# Patient Record
Sex: FEMALE | Race: BLACK OR AFRICAN AMERICAN | NOT HISPANIC OR LATINO | URBAN - METROPOLITAN AREA
[De-identification: names, ages, dates, MRNs, and addresses within clinical notes are randomized per-mention and may not be internally consistent; named-entity substitution may affect disease eponyms.]

---

## 2017-02-24 ENCOUNTER — TRANSFERRED RECORDS (OUTPATIENT)
Dept: HEALTH INFORMATION MANAGEMENT | Facility: CLINIC | Age: 57
End: 2017-02-24

## 2017-02-24 ENCOUNTER — OFFICE VISIT (OUTPATIENT)
Dept: DERMATOLOGY | Facility: CLINIC | Age: 57
End: 2017-02-24

## 2017-02-24 VITALS — HEART RATE: 62 BPM | DIASTOLIC BLOOD PRESSURE: 86 MMHG | SYSTOLIC BLOOD PRESSURE: 151 MMHG

## 2017-02-24 DIAGNOSIS — L91.8 INFLAMED ACROCHORDON: Primary | ICD-10-CM

## 2017-02-24 DIAGNOSIS — L66.9 CICATRICIAL ALOPECIA: ICD-10-CM

## 2017-02-24 DIAGNOSIS — L30.9 DERMATITIS: ICD-10-CM

## 2017-02-24 RX ORDER — LAMOTRIGINE 150 MG/1
300 TABLET ORAL DAILY
COMMUNITY

## 2017-02-24 RX ORDER — FLUNISOLIDE 0.25 MG/ML
2 SOLUTION NASAL PRN
COMMUNITY

## 2017-02-24 RX ORDER — LACOSAMIDE 100 MG/1
100 TABLET ORAL 2 TIMES DAILY
COMMUNITY

## 2017-02-24 RX ORDER — OXCARBAZEPINE 300 MG/1
300 TABLET, FILM COATED ORAL DAILY
COMMUNITY
End: 2017-03-30

## 2017-02-24 RX ORDER — LAMOTRIGINE 100 MG/1
100 TABLET ORAL DAILY
COMMUNITY

## 2017-02-24 NOTE — PROGRESS NOTES
"[facility] Dermatology Note      Dermatology Problem List:  1. 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 On Dermasmoothe FS and Rogaine 5% foam. Improvement noted following ILK10.  2. Achrocordon    Encounter Date: Feb 24, 2017    CC:   Chief Complaint   Patient presents with     Derm Problem     Patient comes to clinic today for hairloss. States \"it's improved a little bit.\"       History of Present Illness:  Ms. Lynne Barnes is a 55 year old female who presents as a 3 month follow-up for cicatricial alopecia. At her list visit (March 2016) Ms. Barnes noted mild improvement following Kenalog 10 injection (Jan 2016, 1.6 ml triamcinolone 10 mg/ml @ 16 sites). Injections were repeated (1.0 ml triamcinolone 10 mg/ml) at 10 sites. She was instructed to continue 5% Rogaine foam daily, Derma Smooth FS oil weekly with shampoo the morning after, and olive oil based shampoo once weekly. She notes adherence with her therapy since March. Today she reports her hair is thickening on the sides, with some increased growth at the vertex and midline of her scalp. She denies any burning or pruritus of the scalp.    Past Medical History:   Patient Active Problem List   Diagnosis     Hyperpigmentation     Dermatitis     Acrochordon     Cicatricial alopecia     Localization-related (focal) (partial) symptomatic epilepsy and epileptic syndromes with complex partial seizures, intractable, with status epilepticus (H)     Atrophy of skin     Past Medical History   Diagnosis Date     Seizures (H)      History reviewed. No pertinent past surgical history.    Medications:  Current Outpatient Prescriptions   Medication Sig Dispense Refill     Lacosamide (VIMPAT) 100 MG TABS tablet Take 100 mg by mouth 2 times daily       lamoTRIgine (LAMICTAL) 150 MG tablet Take 300 mg by mouth daily       lamoTRIgine (LAMICTAL) 100 MG tablet Take 100 mg by mouth daily       OXcarbazepine " (TRILEPTAL) 300 MG tablet Take 300 mg by mouth daily       flunisolide (NASALIDE) 25 MCG/ACT (0.025%) SOLN spray Spray 2 sprays into both nostrils as needed       Fluocinolone Acetonide (DERMA-SMOOTHE/FS SCALP) 0.01 % OIL 1 to 2 capfulls to scalp every other day, no occlusion needed, shampoo in am 118 mL 4     Multiple Vitamins-Minerals (WOMENS DAILY FORMULA) TABS Take by mouth daily       lamoTRIgine (LAMICTAL) 50 MG ER tablet Take 7 tablets (350 mg) by mouth daily (Patient not taking: Reported on 2/24/2017) 210 tablet 2     OXcarbazepine (TRILEPTAL) 300 MG tablet Take 2 tablets (600 mg) by mouth 2 times daily (Patient not taking: Reported on 2/24/2017) 120 tablet 2        Allergies   Allergen Reactions     Oxycodone Cough     Reduces sodium       Review of Systems:  -Constitutional: The patient denies fatigue, fevers, chills, unintended weight loss, and night sweats.  -HEENT: Patient denies nonhealing oral sores.  -Skin: As above in HPI. No additional skin concerns.  -GI: denies abdominal pain, nausea, vomiting, diarrhea, constipation  -MSK: denies muscle aches, weakness, joint aches  -Pulm/CV: denies cough, chest pain, shortness of breath  -Heme: denies bleeding easily, tendency to bruise    Physical exam:  Vitals: /86 (BP Location: Left arm, Patient Position: Chair, Cuff Size: Adult Regular)  Pulse 62  GEN: This is a well developed, well-nourished female in no acute distress, in a pleasant mood.    SKIN: Focused examination of the scalp and face and hands was performed.  -scalp with multiple patches of hair, thickest along partial and occipital regions.   -Hair density decreased along mid-scalp, vertex, and crown.  -compared to previous images of the patient's scalp, mild regrowth noted along midline, vertex, and crown of scalp, though hair still sparse.  -no significant perifollicular erythema appreciated  -mild atrophy in multiple foci along midline of scalp and vertex  -please refer to photos of  scalp  -No other lesions of concern on areas examined.     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 Now s/p  ILK with some regrowth. Will repeat again today.    Kenalog intralesional injection procedure note: After verbal consent and discussion of risks including but not limited to atrophy, pain, and bruising, 1 total ml of Kenalog 10 mg/ml was injected into approximately 10 sites  on the superior scalp and vertex, avoiding areas of atrophy.  The patient tolerated the procedure well and left the Dermatology clinic in good condition. Approximately 10 sites were treated avoiding any atrophic areas.    Wear samantha loosely to prevent further traction.    Continue current therapy: Rogaine 5% foam daily, Derma Smooth FS oil once weekly with shampoo the morning after, olive oil-based shampoo weekly.    Atrophy, secondary to ILK injections. Discussed that this would improve over time. Such areas were avoided today with the ILK injections.     Follow-up in 3 months, earlier for new or changing lesions.     Discussed and examined with Dr. Hester, dermatology attending    Vamsi Michelle MD   PGY-2 Dermatology Resident  Pager [phone]    Staff Involved:  Resident(Vamsi Michelle)/Staff(as above)

## 2017-02-24 NOTE — PATIENT INSTRUCTIONS
1. Extra labs to check including at your visit: Vitamin D, Vitamin B12, Ferritin.   2. Follow-up in 3 months with Dr. Hester for hair loss.   3. Follow-up in 1 month with Dr. Michelle for skin tag near eye.

## 2017-02-24 NOTE — PROGRESS NOTES
"[facility] Dermatology Note      Dermatology Problem List:  1. 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 On Dermasmoothe FS and Rogaine 5% foam. Improvement noted following ILK10, last 6/28/16.  2. Achrocordon. L lateral eyelid  - s/p cyrotherapy 2/25/17    Encounter Date: Feb 24, 2017    CC:   Chief Complaint   Patient presents with     Derm Problem     Patient comes to clinic today for hairloss. States \"it's improved a little bit.\"       History of Present Illness:  Ms. Lynne Barnes is a 55 year old female who presents as follow-up for cicatricial alopecia. She was last seen on 6/28/16 when a total of 1 cc of ILK 10 mg/cc was injected into multiple sites on her scalp.     Today, she reports her hair loss is overall stable to slightly improved from prior. She denies any scalp pruritus, pain, or burning. No new areas of hair loss and feels as though hair density is roughly stable from prior. In terms of treatment, she is using a double dose of minoxidil 5% foam at nightime before bed and an olive oil-based shampoo. She occasionally use dermasmooth oil, about once a week. She usually wears a wig over he scalp so her hair loss is generally not bothersome to her.     She also reports a persistent skin tag on her right lateral eyelid. Occasionally itchy and gets caught on her glasses and sometimes impairs vision. She states this has been treated in the past with cryotherapy with some improvement, though it has since returned. She has noticed some hyperpigmentation in the areas surrounding treatment and is concerned about further cryotherapy to these areas.     Otherwise, she reports a recent breakthrough seizure in setting of hyponatremia (felt to be medication induced from her oxcarbazepine), which is now improving after dose changes to her antiepileptics. She is now taking lamictal 100 mg PO qam, lamictal 150 mg PO afternoon and evening, oxcarbazepine 300 " mg PO BID with plan to further titrate down her oxcarbazepine (presumed causes of her hyponatremia). She otherwise has no other skin concerns.      Past Medical History:   Patient Active Problem List   Diagnosis     Hyperpigmentation     Dermatitis     Acrochordon     Cicatricial alopecia     Localization-related (focal) (partial) symptomatic epilepsy and epileptic syndromes with complex partial seizures, intractable, with status epilepticus (H)     Atrophy of skin     Past Medical History   Diagnosis Date     Seizures (H)      History reviewed. No pertinent past surgical history.    Medications:  Current Outpatient Prescriptions   Medication Sig Dispense Refill     Lacosamide (VIMPAT) 100 MG TABS tablet Take 100 mg by mouth 2 times daily       lamoTRIgine (LAMICTAL) 150 MG tablet Take 300 mg by mouth daily       lamoTRIgine (LAMICTAL) 100 MG tablet Take 100 mg by mouth daily       OXcarbazepine (TRILEPTAL) 300 MG tablet Take 300 mg by mouth daily       flunisolide (NASALIDE) 25 MCG/ACT (0.025%) SOLN spray Spray 2 sprays into both nostrils as needed       Fluocinolone Acetonide (DERMA-SMOOTHE/FS SCALP) 0.01 % OIL 1 to 2 capfulls to scalp every other day, no occlusion needed, shampoo in am 118 mL 4     Multiple Vitamins-Minerals (WOMENS DAILY FORMULA) TABS Take by mouth daily       lamoTRIgine (LAMICTAL) 50 MG ER tablet Take 7 tablets (350 mg) by mouth daily (Patient not taking: Reported on 2/24/2017) 210 tablet 2     OXcarbazepine (TRILEPTAL) 300 MG tablet Take 2 tablets (600 mg) by mouth 2 times daily (Patient not taking: Reported on 2/24/2017) 120 tablet 2        Allergies   Allergen Reactions     Oxycodone Cough     Reduces sodium       Review of Systems:  -Constitutional: The patient denies fatigue, fevers, chills, unintended weight loss, and night sweats.  -HEENT: Patient denies nonhealing oral sores.  -Skin: As above in HPI. No additional skin concerns.  -GI: denies abdominal pain, nausea, vomiting, diarrhea,  constipation  -MSK: denies muscle aches, weakness, joint aches  -Pulm/CV: denies cough, chest pain, shortness of breath  -Heme: denies bleeding easily, tendency to bruise    Physical exam:  Vitals: /86 (BP Location: Left arm, Patient Position: Chair, Cuff Size: Adult Regular)  Pulse 62  GEN: This is a well developed, well-nourished female in no acute distress, in a pleasant mood.    SKIN: Focused examination of the scalp and face and hands was performed.  - There is patchy alopecia of the mid-scalp, vertex, and crown with intermixed terminal hairs. No scalp erythema, perifollicular accentuation, erythema or scale. Negative hair pull test. .  - Mild atrophy in multiple foci along midline of scalp and vertex  - On the right lateral eyelid, there is a 2 mm flesh-colored to hyperpigmented papules with surrounding base of patchy hyperpigmentation. Non-tender to palpation.   - No other lesions of concern on areas examined.     Impression/Plan:    1. 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 Overall disease appears stable on exam today with no active inflammation and we deferred further ILK injections. Would continue current treatment regimen with plan for follow-up in 3 months.     Wear samantha loosely to prevent further traction.    Continue current therapy: Rogaine 5% foam daily, Derma Smooth FS oil once weekly with shampoo the morning after, olive oil-based shampoo weekly.    Clinical photos taken today for future reference    Follow-up in 3 months    2. Inflamed acrochordon. Discussed various treatment options including cryotherapy versus shave excision. She agreed to repeat cryotherapy locally applied with forceps and to consider shave excision in the future if these were not to be effective     Total of 1 lesion as above treated with 10-15 seconds of cryotherapy for 2-cycles. Patient tolerated the procedure well. Risk and benefits including the risk of  scarring and hypo- or hyperpigmentation discussed prior to procedure. Patient expressed understanding.     Follow-up in 1 month with Dr. Michelle in continuity clinic  for possible repeat cryotherapy versus shave excision.     Follow-up in 1 month with Dr. Michelle (acochordon) and in 3 months with Dr. Hester (cicatricial alopecia), earlier for new or changing lesions.    Dr. Hester staffed the patient.     Vamsi Michelle MD   PGY-2 Dermatology Resident  Pager [phone]    Staff Involved:  Resident(Vamsi Michelle)/Staff(as above)    Patient was seen and examined with the dermatology resident. I agree with the history, review of systems, physical examination, assessments and plan. The cryotherapy procedure was performed together.     Arianna Hester MD  Professor and  Chair  Department of Dermatology  [facility]

## 2017-02-24 NOTE — LETTER
"2/24/2017       RE: Lynne Barnes  [address] [place] [address]     Dear Colleague,    Thank you for referring your patient, Lynne Barnes, to the [facility] DERMATOLOGY at [facility]. Please see a copy of my visit note below.                    Pictures taken of patient today to be placed in chart for future reference.      [facility] Dermatology Note      Dermatology Problem List:  1. 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 On Dermasmoothe FS and Rogaine 5% foam. Improvement noted following ILK10, last 6/28/16.  2. Achrocordon. L lateral eyelid  - s/p cyrotherapy 2/25/17    Encounter Date: Feb 24, 2017    CC:   Chief Complaint   Patient presents with     Derm Problem     Patient comes to clinic today for hairloss. States \"it's improved a little bit.\"       History of Present Illness:  Ms. Lynne Barnes is a 55 year old female who presents as follow-up for cicatricial alopecia. She was last seen on 6/28/16 when a total of 1 cc of ILK 10 mg/cc was injected into multiple sites on her scalp.     Today, she reports her hair loss is overall stable to slightly improved from prior. She denies any scalp pruritus, pain, or burning. No new areas of hair loss and feels as though hair density is roughly stable from prior. In terms of treatment, she is using a double dose of minoxidil 5% foam at nightime before bed and an olive oil-based shampoo. She occasionally use dermasmooth oil, about once a week. She usually wears a wig over he scalp so her hair loss is generally not bothersome to her.     She also reports a persistent skin tag on her right lateral eyelid. Occasionally itchy and gets caught on her glasses and sometimes impairs vision. She states this has been treated in the past with cryotherapy with some improvement, though it has since returned. She has noticed some hyperpigmentation in " the areas surrounding treatment and is concerned about further cryotherapy to these areas.     Otherwise, she reports a recent breakthrough seizure in setting of hyponatremia (felt to be medication induced from her oxcarbazepine), which is now improving after dose changes to her antiepileptics. She is now taking lamictal 100 mg PO qam, lamictal 150 mg PO afternoon and evening, oxcarbazepine 300 mg PO BID with plan to further titrate down her oxcarbazepine (presumed causes of her hyponatremia). She otherwise has no other skin concerns.      Past Medical History:   Patient Active Problem List   Diagnosis     Hyperpigmentation     Dermatitis     Acrochordon     Cicatricial alopecia     Localization-related (focal) (partial) symptomatic epilepsy and epileptic syndromes with complex partial seizures, intractable, with status epilepticus (H)     Atrophy of skin     Past Medical History   Diagnosis Date     Seizures (H)      History reviewed. No pertinent past surgical history.    Medications:  Current Outpatient Prescriptions   Medication Sig Dispense Refill     Lacosamide (VIMPAT) 100 MG TABS tablet Take 100 mg by mouth 2 times daily       lamoTRIgine (LAMICTAL) 150 MG tablet Take 300 mg by mouth daily       lamoTRIgine (LAMICTAL) 100 MG tablet Take 100 mg by mouth daily       OXcarbazepine (TRILEPTAL) 300 MG tablet Take 300 mg by mouth daily       flunisolide (NASALIDE) 25 MCG/ACT (0.025%) SOLN spray Spray 2 sprays into both nostrils as needed       Fluocinolone Acetonide (DERMA-SMOOTHE/FS SCALP) 0.01 % OIL 1 to 2 capfulls to scalp every other day, no occlusion needed, shampoo in am 118 mL 4     Multiple Vitamins-Minerals (WOMENS DAILY FORMULA) TABS Take by mouth daily       lamoTRIgine (LAMICTAL) 50 MG ER tablet Take 7 tablets (350 mg) by mouth daily (Patient not taking: Reported on 2/24/2017) 210 tablet 2     OXcarbazepine (TRILEPTAL) 300 MG tablet Take 2 tablets (600 mg) by mouth 2 times daily (Patient not taking:  Reported on 2/24/2017) 120 tablet 2        Allergies   Allergen Reactions     Oxycodone Cough     Reduces sodium       Review of Systems:  -Constitutional: The patient denies fatigue, fevers, chills, unintended weight loss, and night sweats.  -HEENT: Patient denies nonhealing oral sores.  -Skin: As above in HPI. No additional skin concerns.  -GI: denies abdominal pain, nausea, vomiting, diarrhea, constipation  -MSK: denies muscle aches, weakness, joint aches  -Pulm/CV: denies cough, chest pain, shortness of breath  -Heme: denies bleeding easily, tendency to bruise    Physical exam:  Vitals: /86 (BP Location: Left arm, Patient Position: Chair, Cuff Size: Adult Regular)  Pulse 62  GEN: This is a well developed, well-nourished female in no acute distress, in a pleasant mood.    SKIN: Focused examination of the scalp and face and hands was performed.  - There is patchy alopecia of the mid-scalp, vertex, and crown with intermixed terminal hairs. No scalp erythema, perifollicular accentuation, erythema or scale. Negative hair pull test. .  - Mild atrophy in multiple foci along midline of scalp and vertex  - On the right lateral eyelid, there is a 2 mm flesh-colored to hyperpigmented papules with surrounding base of patchy hyperpigmentation. Non-tender to palpation.   - No other lesions of concern on areas examined.     Impression/Plan:    1. 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 Overall disease appears stable on exam today with no active inflammation and we deferred further ILK injections. Would continue current treatment regimen with plan for follow-up in 3 months.     Wear samantha loosely to prevent further traction.    Continue current therapy: Rogaine 5% foam daily, Derma Smooth FS oil once weekly with shampoo the morning after, olive oil-based shampoo weekly.    Clinical photos taken today for future reference    Follow-up in 3 months    2. Inflamed  elvia. Discussed various treatment options including cryotherapy versus shave excision. She agreed to repeat cryotherapy locally applied with forceps and to consider shave excision in the future if these were not to be effective     Total of 1 lesion as above treated with 10-15 seconds of cryotherapy for 2-cycles. Patient tolerated the procedure well. Risk and benefits including the risk of scarring and hypo- or hyperpigmentation discussed prior to procedure. Patient expressed understanding.     Follow-up in 1 month with Dr. Michelle in continuity clinic  for possible repeat cryotherapy versus shave excision.     Follow-up in 1 month with Dr. Michelle (acochordon) and in 3 months with Dr. Hester (cicatricial alopecia), earlier for new or changing lesions.    Dr. Hester staffed the patient.     Vamsi Michelle MD   PGY-2 Dermatology Resident  Pager [phone]    Staff Involved:  Resident(Vamsi Michelle)/Staff(as above)    Patient was seen and examined with the dermatology resident. I agree with the history, review of systems, physical examination, assessments and plan. The cryotherapy procedure was performed together.     Arianna Hester MD  Professor and  Chair  Department of Dermatology  [facility]

## 2017-02-24 NOTE — NURSING NOTE
"Dermatology Rooming Note    Lynne Barnes's goals for this visit include:   Chief Complaint   Patient presents with     Derm Problem     Patient comes to clinic today for hairloss. States \"it's improved a little bit.\"     Leonor Andujar, [facility]    "

## 2017-02-24 NOTE — MR AVS SNAPSHOT
After Visit Summary   2/24/2017    Lynne Barnes    MRN: [id number]           Patient Information     Date Of Birth          12/20/1961        Visit Information        Provider Department      2/24/2017 9:30 AM Arianna Hester MD [facility]        Today's Diagnoses     Inflamed acrochordon    -  1      Care Instructions    1. Extra labs to check including at your visit: Vitamin D, Vitamin B12, Ferritin.   2. Follow-up in 3 months with Dr. Hester for hair loss.   3. Follow-up in 1 month with Dr. Michelle for skin tag near eye.         Follow-ups after your visit        Your next 10 appointments already scheduled     Mar 30, 2017 10:30 AM CDT   (Arrive by 10:15 AM)   RETURN HAIRLOSS with Vamsi Michelle MD   [facility] ([facility])    [address] [place] [address] [phone]              Who to contact     Please call your clinic at [phone] to:    Ask questions about your health    Make or cancel appointments    Discuss your medicines    Learn about your test results    Speak to your doctor   If you have compliments or concerns about an experience at your clinic, or if you wish to file a complaint, please contact [facility] Patient Relations at [phone] or email us at [email or web address]         Additional Information About Your Visit        MyChart Information     [employer] is an electronic gateway that provides easy, online access to your medical records. With OR[employer]O, you can request a clinic appointment, read your test results, renew a prescription or communicate with your care team.     To sign up for [employer] visit the website at [email or web address]   You will be asked to enter the access code listed below, as well as some personal information. Please follow the directions to create your username and password.     Your access code is:  [email or web address]  Expires: 2017  6:30 AM     Your access code will  in 90 days. If you need help or a new code, please contact your [facility] or call [phone] for assistance.        Care EveryWhere ID     This is your Care EveryWhere ID. This could be used by other organizations to access your [place] medical records  [id number]        Your Vitals Were     Pulse                   62            Blood Pressure from Last 3 Encounters:   17 151/86   16 166/90   16 150/67    Weight from Last 3 Encounters:   16 54.4 kg (120 lb)   16 58.7 kg (129 lb 6.4 oz)   11/13/15 54.3 kg (119 lb 9.6 oz)              We Performed the Following     DESTRUCT BENIGN LESION, UP TO 14        Primary Care Provider Office Phone # Fax #    Luis Honeycutt [phone]       [place] [address] [address] [address]        Thank you!     Thank you for choosing [facility]ER[facility]  for your care. Our goal is always to provide you with excellent care. Hearing back from our patients is one way we can continue to improve our services. Please take a few minutes to complete the written survey that you may receive in the mail after your visit with us. Thank you!             Your Updated Medication List - Protect others around you: Learn how to safely use, store and throw away your medicines at www.disposemymeds.org.          This list is accurate as of: 17 10:44 AM.  Always use your most recent med list.                   Brand Name Dispense Instructions for use    DERMA-SMOOTHE/FS SCALP 0.01 % Oil     118 mL    1 to 2 capfulls to scalp every other day, no occlusion needed, shampoo in am       flunisolide 25 MCG/ACT (0.025%) Soln spray    NASALIDE     Spray 2 sprays into both nostrils as needed       * LAMICTAL 150 MG tablet   Generic drug:  lamoTRIgine      Take 300 mg by mouth daily       * LAMICTAL 100 MG tablet   Generic drug:   lamoTRIgine      Take 100 mg by mouth daily       * lamoTRIgine 50 MG 24 hr tablet    LaMICtal    210 tablet    Take 7 tablets (350 mg) by mouth daily       * TRILEPTAL 300 MG tablet   Generic drug:  OXcarbazepine      Take 300 mg by mouth daily       * OXcarbazepine 300 MG tablet    TRILEPTAL    120 tablet    Take 2 tablets (600 mg) by mouth 2 times daily       VIMPAT 100 MG Tabs tablet   Generic drug:  Lacosamide      Take 100 mg by mouth 2 times daily       WOMENS DAILY FORMULA Tabs      Take by mouth daily       * Notice:  This list has 5 medication(s) that are the same as other medications prescribed for you. Read the directions carefully, and ask your doctor or other care provider to review them with you.

## 2017-03-02 ENCOUNTER — TELEPHONE (OUTPATIENT)
Dept: DERMATOLOGY | Facility: CLINIC | Age: 57
End: 2017-03-02

## 2017-03-30 ENCOUNTER — OFFICE VISIT (OUTPATIENT)
Dept: DERMATOLOGY | Facility: CLINIC | Age: 57
End: 2017-03-30

## 2017-03-30 DIAGNOSIS — H01.9 DERMATITIS OF EYELIDS OF BOTH EYES: ICD-10-CM

## 2017-03-30 DIAGNOSIS — L66.9 CICATRICIAL ALOPECIA: ICD-10-CM

## 2017-03-30 DIAGNOSIS — L91.8 INFLAMED ACROCHORDON: Primary | ICD-10-CM

## 2017-03-30 ASSESSMENT — PAIN SCALES - GENERAL: PAINLEVEL: NO PAIN (0)

## 2017-03-30 NOTE — PATIENT INSTRUCTIONS
For eyelid itch,   1. Start using over-the-counter hydrocortisone 1% ointment once or twice daily as needed.

## 2017-03-30 NOTE — NURSING NOTE
Chief Complaint   Patient presents with     Derm Problem     Has a small lesion next to her right eye she would like checked out     Ajay Ayala LPN

## 2017-03-30 NOTE — PROGRESS NOTES
[facility] Dermatology Note      Dermatology Problem List:  1. 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 On Dermasmoothe FS and Rogaine 5% foam. Improvement noted following ILK10, last 6/28/16.  2. Achrocordon. R lateral eyelid  - s/p cyrotherapy 2/25/17  3. Eyelid dermatitis, bilaterally.   - hydrocortisone 1% ointment BID PRN    Encounter Date: Mar 30, 2017    CC:   Chief Complaint   Patient presents with     Derm Problem     Has a small lesion next to her right eye she would like checked out       History of Present Illness:  Ms. Lynne Barnes is a 56 year old female who presents as follow-up for achrocodron on her R lateral eyelid. She was last seen on 2/24/17 for follow-up of cicatricial alopecia with Dr. Hester. She incidentally also had an acrochordon on her R lateral eyelid that was treated with cryotherapy with plan for follow-up in 1 month if this do not resolve to consider repeat cryotherapy or shave excision.     Today, she reports that her lesion is improved but still present. Has noticed some mild hyperpigmentation around site where it was treated last time. Of note, does report a 2-3 week history of intermittent pruritus around the bilateral eyelids (L>R). She has been applying vaseline to that area without relief. Denies any new topical exposures or known allergies. Does wear nail polish. She has no other skin concerns.       Past Medical History:   Patient Active Problem List   Diagnosis     Hyperpigmentation     Dermatitis     Acrochordon     Cicatricial alopecia     Localization-related (focal) (partial) symptomatic epilepsy and epileptic syndromes with complex partial seizures, intractable, with status epilepticus (H)     Atrophy of skin     Past Medical History:   Diagnosis Date     Seizures (H)      History reviewed. No pertinent surgical history.    Medications:  Current Outpatient Prescriptions   Medication Sig Dispense Refill      MULTIPLE VITAMINS-MINERALS PO        Lacosamide (VIMPAT) 100 MG TABS tablet Take 100 mg by mouth 2 times daily       lamoTRIgine (LAMICTAL) 150 MG tablet Take 300 mg by mouth daily       lamoTRIgine (LAMICTAL) 100 MG tablet Take 100 mg by mouth daily       flunisolide (NASALIDE) 25 MCG/ACT (0.025%) SOLN spray Spray 2 sprays into both nostrils as needed       OXcarbazepine (TRILEPTAL) 300 MG tablet Take 2 tablets (600 mg) by mouth 2 times daily 120 tablet 2     Fluocinolone Acetonide (DERMA-SMOOTHE/FS SCALP) 0.01 % OIL 1 to 2 capfulls to scalp every other day, no occlusion needed, shampoo in am 118 mL 4     [DISCONTINUED] OXcarbazepine (TRILEPTAL) 300 MG tablet Take 300 mg by mouth daily       [DISCONTINUED] lamoTRIgine (LAMICTAL) 50 MG ER tablet Take 7 tablets (350 mg) by mouth daily (Patient not taking: Reported on 2/24/2017) 210 tablet 2        Allergies   Allergen Reactions     Oxycodone Cough     Reduces sodium       Review of Systems:  -Constitutional: The patient denies fatigue, fevers, chills, unintended weight loss, and night sweats.  -HEENT: Patient denies nonhealing oral sores.  -Skin: As above in HPI. No additional skin concerns.  -GI: denies abdominal pain, nausea, vomiting, diarrhea, constipation  -MSK: denies muscle aches, weakness, joint aches  -Pulm/CV: denies cough, chest pain, shortness of breath  -Heme: denies bleeding easily, tendency to bruise    Physical exam:  Vitals: There were no vitals taken for this visit.  GEN: This is a well developed, well-nourished female in no acute distress, in a pleasant mood.    SKIN: Focused examination of the scalp and face and hands was performed.  - There is patchy alopecia of the mid-scalp, vertex, and crown with intermixed terminal hairs. No scalp erythema, perifollicular accentuation, erythema or scale. Negative hair pull test. .  - Mild atrophy in multiple foci along midline of scalp and vertex  - On the right lateral eyelid, there is a 2 mm  flesh-colored to hyperpigmented papule with surrounding base of patchy hyperpigmentation. Non-tender to palpation.   - Mild hyperpigmentation bilaterally on upper and lower eyelids.   - No other lesions of concern on areas examined.     Impression/Plan:    1. Inflamed acrochordon. Recommended treatment with electrocautery today for removal and to limit possibility of post-inflammatory hyperpigmented    Total of 1 lesion as above treated with electrocautery at setting of 3. Patient tolerated the procedure well and lesion was completely removed.     Follow-up as needed.     2. Eyelid dermatitis. Recommended starting OTC hydrocortisone 1% ointment as needed until resolved.     3. 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      Follow-up in 2 months with Dr. Hester as previously planned.     Follow-up as needed with Dr. Michelle, 2 months with Dr. Hester for CCA.   Dr. Bales staffed the patient.     Vamsi Michelle MD   PGY-2 Dermatology Resident  Pager [phone]    Staff Involved:  Resident(Vamsi Michelle)/Staff(as above)

## 2017-03-30 NOTE — PROGRESS NOTES
I talked with and examined Lynne Barnes and I agree with the assessment and the plan. I was present for the entire procedure. RAJNI Bales MD.

## 2017-03-30 NOTE — LETTER
3/30/2017       RE: Lynne Barnes  [address] [place] [address]     Dear Colleague,    Thank you for referring your patient, Lynne Barnes, to the [facility] at [facility]. Please see a copy of my visit note below.    I talked with and examined Lynne Barnes and I agree with the assessment and the plan. I was present for the entire procedure. RAJNI Bales MD.      [facility] Dermatology Note      Dermatology Problem List:  1. 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 On Dermasmoothe FS and Rogaine 5% foam. Improvement noted following ILK10, last 6/28/16.  2. Achrocordon. R lateral eyelid  - s/p cyrotherapy 2/25/17  3. Eyelid dermatitis, bilaterally.   - hydrocortisone 1% ointment BID PRN    Encounter Date: Mar 30, 2017    CC:   Chief Complaint   Patient presents with     Derm Problem     Has a small lesion next to her right eye she would like checked out       History of Present Illness:  Ms. Lynne Barnes is a 56 year old female who presents as follow-up for achrocodron on her R lateral eyelid. She was last seen on 2/24/17 for follow-up of cicatricial alopecia with Dr. Hester. She incidentally also had an acrochordon on her R lateral eyelid that was treated with cryotherapy with plan for follow-up in 1 month if this do not resolve to consider repeat cryotherapy or shave excision.     Today, she reports that her lesion is improved but still present. Has noticed some mild hyperpigmentation around site where it was treated last time. Of note, does report a 2-3 week history of intermittent pruritus around the bilateral eyelids (L>R). She has been applying vaseline to that area without relief. Denies any new topical exposures or known allergies. Does wear nail polish. She has no other skin concerns.       Past Medical History:   Patient Active Problem List    Diagnosis     Hyperpigmentation     Dermatitis     Acrochordon     Cicatricial alopecia     Localization-related (focal) (partial) symptomatic epilepsy and epileptic syndromes with complex partial seizures, intractable, with status epilepticus (H)     Atrophy of skin     Past Medical History:   Diagnosis Date     Seizures (H)      History reviewed. No pertinent surgical history.    Medications:  Current Outpatient Prescriptions   Medication Sig Dispense Refill     MULTIPLE VITAMINS-MINERALS PO        Lacosamide (VIMPAT) 100 MG TABS tablet Take 100 mg by mouth 2 times daily       lamoTRIgine (LAMICTAL) 150 MG tablet Take 300 mg by mouth daily       lamoTRIgine (LAMICTAL) 100 MG tablet Take 100 mg by mouth daily       flunisolide (NASALIDE) 25 MCG/ACT (0.025%) SOLN spray Spray 2 sprays into both nostrils as needed       OXcarbazepine (TRILEPTAL) 300 MG tablet Take 2 tablets (600 mg) by mouth 2 times daily 120 tablet 2     Fluocinolone Acetonide (DERMA-SMOOTHE/FS SCALP) 0.01 % OIL 1 to 2 capfulls to scalp every other day, no occlusion needed, shampoo in am 118 mL 4     [DISCONTINUED] OXcarbazepine (TRILEPTAL) 300 MG tablet Take 300 mg by mouth daily       [DISCONTINUED] lamoTRIgine (LAMICTAL) 50 MG ER tablet Take 7 tablets (350 mg) by mouth daily (Patient not taking: Reported on 2/24/2017) 210 tablet 2        Allergies   Allergen Reactions     Oxycodone Cough     Reduces sodium       Review of Systems:  -Constitutional: The patient denies fatigue, fevers, chills, unintended weight loss, and night sweats.  -HEENT: Patient denies nonhealing oral sores.  -Skin: As above in HPI. No additional skin concerns.  -GI: denies abdominal pain, nausea, vomiting, diarrhea, constipation  -MSK: denies muscle aches, weakness, joint aches  -Pulm/CV: denies cough, chest pain, shortness of breath  -Heme: denies bleeding easily, tendency to bruise    Physical exam:  Vitals: There were no vitals taken for this visit.  GEN: This is a well  developed, well-nourished female in no acute distress, in a pleasant mood.    SKIN: Focused examination of the scalp and face and hands was performed.  - There is patchy alopecia of the mid-scalp, vertex, and crown with intermixed terminal hairs. No scalp erythema, perifollicular accentuation, erythema or scale. Negative hair pull test. .  - Mild atrophy in multiple foci along midline of scalp and vertex  - On the right lateral eyelid, there is a 2 mm flesh-colored to hyperpigmented papule with surrounding base of patchy hyperpigmentation. Non-tender to palpation.   - Mild hyperpigmentation bilaterally on upper and lower eyelids.   - No other lesions of concern on areas examined.     Impression/Plan:    1. Inflamed acrochordon. Recommended treatment with electrocautery today for removal and to limit possibility of post-inflammatory hyperpigmented    Total of 1 lesion as above treated with electrocautery at setting of 3. Patient tolerated the procedure well and lesion was completely removed.     Follow-up as needed.     2. Eyelid dermatitis. Recommended starting OTC hydrocortisone 1% ointment as needed until resolved.     3. 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      Follow-up in 2 months with Dr. Hester as previously planned.     Follow-up as needed with Dr. Michelle, 2 months with Dr. Hester for CCA.   Dr. Bales staffed the patient.     Vamsi Michelle MD   PGY-2 Dermatology Resident  Pager [phone]    Staff Involved:  Resident(Vamsi Michelle)/Staff(as above)    Again, thank you for allowing me to participate in the care of your patient.      Sincerely,    Vamsi Michelle MD

## 2017-03-30 NOTE — MR AVS SNAPSHOT
After Visit Summary   3/30/2017    Lynne Barnes    MRN: [id number]           Patient Information     Date Of Birth          1960        Visit Information        Provider Department      3/30/2017 10:30 AM Vamsi Michelle MD [facility]        Care Instructions    For eyelid itch,   1. Start using over-the-counter hydrocortisone 1% ointment once or twice daily as needed.         Follow-ups after your visit        Who to contact     Please call your clinic at [phone] to:    Ask questions about your health    Make or cancel appointments    Discuss your medicines    Learn about your test results    Speak to your doctor   If you have compliments or concerns about an experience at your clinic, or if you wish to file a complaint, please contact [facility] Patient Relations at [phone] or email us at [email or web address]         Additional Information About Your Visit        MyChart Information     [employer] is an electronic gateway that provides easy, online access to your medical records. With [address], you can request a clinic appointment, read your test results, renew a prescription or communicate with your care team.     To sign up for [employer] visit the website at [email or web address]   You will be asked to enter the access code listed below, as well as some personal information. Please follow the directions to create your username and password.     Your access code is: [email or web address]  Expires: 2017  7:30 AM     Your access code will  in 90 days. If you need help or a new code, please contact your [facility] or call [phone] for assistance.        Care EveryWhere ID     This is your Care EveryWhere ID. This could be used by other organizations to access your [place] medical records  [id number]         Blood Pressure from Last 3 Encounters:   17 151/86   16 166/90    04/30/16 150/67    Weight from Last 3 Encounters:   04/27/16 54.4 kg (120 lb)   01/22/16 58.7 kg (129 lb 6.4 oz)   11/13/15 54.3 kg (119 lb 9.6 oz)              Today, you had the following     No orders found for display         Today's Medication Changes          These changes are accurate as of: 3/30/17 11:04 AM.  If you have any questions, ask your nurse or doctor.               These medicines have changed or have updated prescriptions.        Dose/Directions    * LAMICTAL 150 MG tablet   This may have changed:  Another medication with the same name was removed. Continue taking this medication, and follow the directions you see here.   Generic drug:  lamoTRIgine   Changed by:  Arianna Hester MD        Dose:  300 mg   Take 300 mg by mouth daily   Refills:  0       * LAMICTAL 100 MG tablet   This may have changed:  Another medication with the same name was removed. Continue taking this medication, and follow the directions you see here.   Generic drug:  lamoTRIgine   Changed by:  Arianna Hester MD        Dose:  100 mg   Take 100 mg by mouth daily   Refills:  0       MULTIPLE VITAMINS-MINERALS PO   This may have changed:  Another medication with the same name was removed. Continue taking this medication, and follow the directions you see here.   Changed by:  Vamsi Michelle MD        Refills:  0       OXcarbazepine 300 MG tablet   Commonly known as:  TRILEPTAL   This may have changed:  Another medication with the same name was removed. Continue taking this medication, and follow the directions you see here.   Used for:  Epilepsy without status epilepticus, not intractable, unspecified        Dose:  600 mg   Take 2 tablets (600 mg) by mouth 2 times daily   Quantity:  120 tablet   Refills:  2       * Notice:  This list has 2 medication(s) that are the same as other medications prescribed for you. Read the directions carefully, and ask your doctor or other care provider to review  them with you.             Primary Care Provider Office Phone # Fax #    Luis Honeycutt [phone]       [place] [address] [address] [facility] [address]  [facility] [address]        Thank you!     Thank you for choosing [facility]ER[facility]  for your care. Our goal is always to provide you with excellent care. Hearing back from our patients is one way we can continue to improve our services. Please take a few minutes to complete the written survey that you may receive in the mail after your visit with us. Thank you!             Your Updated Medication List - Protect others around you: Learn how to safely use, store and throw away your medicines at www.disposemymeds.org.          This list is accurate as of: 3/30/17 11:04 AM.  Always use your most recent med list.                   Brand Name Dispense Instructions for use    DERMA-SMOOTHE/FS SCALP 0.01 % Oil     118 mL    1 to 2 capfulls to scalp every other day, no occlusion needed, shampoo in am       flunisolide 25 MCG/ACT (0.025%) Soln spray    NASALIDE     Spray 2 sprays into both nostrils as needed       * LAMICTAL 150 MG tablet   Generic drug:  lamoTRIgine      Take 300 mg by mouth daily       * LAMICTAL 100 MG tablet   Generic drug:  lamoTRIgine      Take 100 mg by mouth daily       MULTIPLE VITAMINS-MINERALS PO          OXcarbazepine 300 MG tablet    TRILEPTAL    120 tablet    Take 2 tablets (600 mg) by mouth 2 times daily       VIMPAT 100 MG Tabs tablet   Generic drug:  Lacosamide      Take 100 mg by mouth 2 times daily       * Notice:  This list has 2 medication(s) that are the same as other medications prescribed for you. Read the directions carefully, and ask your doctor or other care provider to review them with you.

## 2017-05-24 ENCOUNTER — TELEPHONE (OUTPATIENT)
Dept: DERMATOLOGY | Facility: CLINIC | Age: 57
End: 2017-05-24

## 2017-09-01 ENCOUNTER — OFFICE VISIT (OUTPATIENT)
Dept: DERMATOLOGY | Facility: CLINIC | Age: 57
End: 2017-09-01

## 2017-09-01 VITALS — SYSTOLIC BLOOD PRESSURE: 164 MMHG | HEART RATE: 57 BPM | DIASTOLIC BLOOD PRESSURE: 85 MMHG

## 2017-09-01 DIAGNOSIS — L66.9 SCARRING ALOPECIA: ICD-10-CM

## 2017-09-01 DIAGNOSIS — L30.9 DERMATITIS: Primary | ICD-10-CM

## 2017-09-01 RX ORDER — PIMECROLIMUS 10 MG/G
CREAM TOPICAL DAILY
Qty: 60 G | Refills: 1 | Status: SHIPPED | OUTPATIENT
Start: 2017-09-01 | End: 2018-09-07

## 2017-09-01 RX ORDER — FLUOCINOLONE ACETONIDE 0.11 MG/ML
OIL TOPICAL
Qty: 118 ML | Refills: 4 | Status: SHIPPED | OUTPATIENT
Start: 2017-09-01 | End: 2017-12-08

## 2017-09-01 ASSESSMENT — PAIN SCALES - GENERAL: PAINLEVEL: NO PAIN (0)

## 2017-09-01 NOTE — LETTER
"9/1/2017       RE: Lynne Barnes  [address] [place] [address]     Dear Colleague,    Thank you for referring your patient, Lynne Barnes, to the [facility] DERMATOLOGY at [facility]. Please see a copy of my visit note below.    [facility] Dermatology Note      Dermatology Problem List:  1.Cicatricial alopecia - biopsy 2/2014 with Ddx of LPP, FFA, traction alopecia   2.Acrochordon R lateral eyelid  -s/p cryotherapy 2/25/17 and electrocautery 3/30/17    Encounter Date: Sep 1, 2017    CC:  Chief Complaint   Patient presents with     Hair Loss     Lynne is here today for a recheck for hairloss. She states that the front is still having issues growning.          History of Present Illness:  Ms. Lynne Barnes is a 56 year old female who is here to follow up on cicatricial alopecia, inflamed acrochordon, and eyelid dermatitis, last seen 3/30. She is using rogaine 5% foam nightly, and dermasmoothe oil every other week. For shampoo she uses \"All Purpose\" shampoo/conditioner in one nightly. She feels her hair loss is overall slightly improved in the back with no change in the front. She feels that at times she sees some growth on the front but then it goes away. She denies scalp pain, itching, burning, or tingling.    She continues to have periorbital itching and is also concerned about the hyperpigmentation around her eyes. She never started using the hydrocortisone cream for eyelid dermatitis. She has been applying vaseline to the area with no relief. The acrochordon treated on the right lateral eyelid is now gone. She also reports two seizures in the last month, she has since seen her neurologist who put her back on lamictal. Otherwise has been healthy.     Past Medical History:   Patient Active Problem List   Diagnosis     Hyperpigmentation     Dermatitis     Acrochordon     Cicatricial alopecia     Localization-related " (focal) (partial) symptomatic epilepsy and epileptic syndromes with complex partial seizures, intractable, with status epilepticus (H)     Atrophy of skin     Past Medical History:   Diagnosis Date     Seizures (H)      History reviewed. No pertinent surgical history.      Medications:  Current Outpatient Prescriptions   Medication Sig Dispense Refill     MULTIPLE VITAMINS-MINERALS PO        Lacosamide (VIMPAT) 100 MG TABS tablet Take 100 mg by mouth 2 times daily       lamoTRIgine (LAMICTAL) 150 MG tablet Take 300 mg by mouth daily       lamoTRIgine (LAMICTAL) 100 MG tablet Take 100 mg by mouth daily       flunisolide (NASALIDE) 25 MCG/ACT (0.025%) SOLN spray Spray 2 sprays into both nostrils as needed       OXcarbazepine (TRILEPTAL) 300 MG tablet Take 2 tablets (600 mg) by mouth 2 times daily 120 tablet 2     Fluocinolone Acetonide (DERMA-SMOOTHE/FS SCALP) 0.01 % OIL 1 to 2 capfulls to scalp every other day, no occlusion needed, shampoo in am 118 mL 4     Allergies   Allergen Reactions     Oxycodone Cough     Reduces sodium         Review of Systems:  -Constitutional: The patient denies fatigue, fevers, chills, unintended weight loss, and night sweats.  -HEENT: Patient denies nonhealing oral sores.  -Skin: As above in HPI. No additional skin concerns.    Physical exam:  Vitals: /85 (BP Location: Right arm, Patient Position: Chair, Cuff Size: Adult Regular)  Pulse 57  GEN: This is a well developed, well-nourished female in no acute distress, in a pleasant mood.    SKIN: Focused examination of the scalp and face was performed.  -LPPAI score 0 today, there is no perifollicular erythema or scale, and no generalized erythema  -Hair pull test negative  -Overall improved appearance of hair on mid scalp and vertex compared to photographs taken 2/24, with increased short terminal fibers filling in patch of alopecia   -Frontal and temporal hair line stable   -Periorbital region bilaterally is hyperpigmented, however  no erythema or scale noted  -Numerous dark brown papules on upper cheeks bilaterally   -No other lesions of concern on areas examined.     Impression/Plan:  1. 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 Overall appears stable to slightly improved on exam today, with no active inflammation evident. Discussed continuing current treatments and purchasing a HairMax lasercomb as it may help stimulate further regrowth.     Continue Rogaine 5% foam daily    Increase Dermasmooth/FS 0.01% oil to once a week     Continue to shampoo nightly with preferred gentle shampoo     2. Periorbital dermatitis and pruritus - No evidence for active inflammation today however she continues to have significant periorbital itching. Discussed trying elidel cream as topical steroid may worsen hyperpigmentation.     Start pimecrolimus 1% cream daily on affected area    Follow-up in 3 months, earlier for new or changing lesions.     Staff Involved:  Scribed by Consuelo Cortez, MS4 for Dr. Hester.        I agree with the PFSH and ROS as completed by the Medical Student. The remainder of the encounter was performed by me and scribed by the Medical Student. The scribed note accurately reflects my personal services and the medical decisions made by me.      Arianna Hester MD  Professor and Chair  Department of Dermatology  [facility]

## 2017-09-01 NOTE — NURSING NOTE
Chief Complaint   Patient presents with     Hair Loss     Lynne is here today for a recheck for hairloss. She states that the front is still having issues growning.      Marge Jones, CMA

## 2017-09-01 NOTE — PROGRESS NOTES
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 She states that the front is still having issues growning.          History of Present Illness:  Ms. Lynne Barnes is a 56 year old female who is here to follow up on cicatricial alopecia, inflamed acrochordon, and eyelid dermatitis, last seen 3/30. She is using rogaine 5% foam nightly, and dermasmoothe oil every other week. For shampoo she uses \"All Purpose\" shampoo/conditioner in one nightly. She feels her hair loss is overall slightly improved in the back with no change in the front. She feels that at times she sees some growth on the front but then it goes away. She denies scalp pain, itching, burning, or tingling.    She continues to have periorbital itching and is also concerned about the hyperpigmentation around her eyes. She never started using the hydrocortisone cream for eyelid dermatitis. She has been applying vaseline to the area with no relief. The acrochordon treated on the right lateral eyelid is now gone. She also reports two seizures in the last month, she has since seen her neurologist who put her back on lamictal. Otherwise has been healthy.     Past Medical History:   Patient Active Problem List   Diagnosis     Hyperpigmentation     Dermatitis     Acrochordon     Cicatricial alopecia     Localization-related (focal) (partial) symptomatic epilepsy and epileptic syndromes with complex partial seizures, intractable, with status epilepticus (H)     Atrophy of skin     Past Medical History:   Diagnosis Date     Seizures (H)      History reviewed. No pertinent surgical history.      Medications:  Current " Outpatient Prescriptions   Medication Sig Dispense Refill     MULTIPLE VITAMINS-MINERALS PO        Lacosamide (VIMPAT) 100 MG TABS tablet Take 100 mg by mouth 2 times daily       lamoTRIgine (LAMICTAL) 150 MG tablet Take 300 mg by mouth daily       lamoTRIgine (LAMICTAL) 100 MG tablet Take 100 mg by mouth daily       flunisolide (NASALIDE) 25 MCG/ACT (0.025%) SOLN spray Spray 2 sprays into both nostrils as needed       OXcarbazepine (TRILEPTAL) 300 MG tablet Take 2 tablets (600 mg) by mouth 2 times daily 120 tablet 2     Fluocinolone Acetonide (DERMA-SMOOTHE/FS SCALP) 0.01 % OIL 1 to 2 capfulls to scalp every other day, no occlusion needed, shampoo in am 118 mL 4     Allergies   Allergen Reactions     Oxycodone Cough     Reduces sodium         Review of Systems:  -Constitutional: The patient denies fatigue, fevers, chills, unintended weight loss, and night sweats.  -HEENT: Patient denies nonhealing oral sores.  -Skin: As above in HPI. No additional skin concerns.    Physical exam:  Vitals: /85 (BP Location: Right arm, Patient Position: Chair, Cuff Size: Adult Regular)  Pulse 57  GEN: This is a well developed, well-nourished female in no acute distress, in a pleasant mood.    SKIN: Focused examination of the scalp and face was performed.  -LPPAI score 0 today, there is no perifollicular erythema or scale, and no generalized erythema  -Hair pull test negative  -Overall improved appearance of hair on mid scalp and vertex compared to photographs taken 2/24, with increased short terminal fibers filling in patch of alopecia   -Frontal and temporal hair line stable   -Periorbital region bilaterally is hyperpigmented, however no erythema or scale noted  -Numerous dark brown papules on upper cheeks bilaterally   -No other lesions of concern on areas examined.     Impression/Plan:  1. 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 Overall appears stable to slightly  improved on exam today, with no active inflammation evident. Discussed continuing current treatments and purchasing a HairMax lasercomb as it may help stimulate further regrowth.     Continue Rogaine 5% foam daily    Increase Dermasmooth/FS 0.01% oil to once a week     Continue to shampoo nightly with preferred gentle shampoo     2. Periorbital dermatitis and pruritus - No evidence for active inflammation today however she continues to have significant periorbital itching. Discussed trying elidel cream as topical steroid may worsen hyperpigmentation.     Start pimecrolimus 1% cream daily on affected area    Follow-up in 3 months, earlier for new or changing lesions.     Staff Involved:  Scribed by Consuelo Cortez, MS4 for Dr. Hester.        I agree with the PFSH and ROS as completed by the Medical Student. The remainder of the encounter was performed by me and scribed by the Medical Student. The scribed note accurately reflects my personal services and the medical decisions made by me.      Arianna Hester MD  Professor and Chair  Department of Dermatology  [facility]

## 2017-09-01 NOTE — MR AVS SNAPSHOT
After Visit Summary   2017    Lynne Barnes    MRN: [id number]           Patient Information     Date Of Birth          1960        Visit Information        Provider Department      2017 10:15 AM Arianna Hester MD [facility] Dermatology        Today's Diagnoses     Dermatitis    -  1    Scarring alopecia           Follow-ups after your visit        Your next 10 appointments already scheduled     Dec 08, 2017 10:00 AM CST   (Arrive by 9:45 AM)   RETURN HAIRLOSS with MD ANTHONY Kincaid [facility] Dermatology ([facility] [place])    [address] [place] [id number] [phone]              Who to contact     Please call your clinic at [phone] to:    Ask questions about your health    Make or cancel appointments    Discuss your medicines    Learn about your test results    Speak to your doctor   If you have compliments or concerns about an experience at your clinic, or if you wish to file a complaint, please contact [facility] Patient Relations at [phone] or email us at [email or web address]         Additional Information About Your Visit        MyChart Information     [employer] is an electronic gateway that provides easy, online access to your medical records. With [employer], you can request a clinic appointment, read your test results, renew a prescription or communicate with your care team.     To sign up for [employer] visit the website at [email or web address]   You will be asked to enter the access code listed below, as well as some personal information. Please follow the directions to create your username and password.     Your access code is: [email or web address]  Expires: 2017 11:35 AM     Your access code will  in 90 days. If you need help or a new code, please contact your [facility] Clinic or call [phone] for assistance.         Care EveryWhere ID     This is your Care EveryWhere ID. This could be used by other organizations to access your [place] medical records  [id number]        Your Vitals Were     Pulse                   57            Blood Pressure from Last 3 Encounters:   09/01/17 164/85   02/24/17 151/86   06/28/16 166/90    Weight from Last 3 Encounters:   04/27/16 54.4 kg (120 lb)   01/22/16 58.7 kg (129 lb 6.4 oz)   11/13/15 54.3 kg (119 lb 9.6 oz)              Today, you had the following     No orders found for display       Primary Care Provider Office Phone # Fax #    Luis Honeycutt [phone]       [place] [address]  [facility] [address]        Equal Access to Services     JESUS GIANES : Hadii aad ku hadasho Soomaali, waaxda luqadaha, qaybta kaalmada adeegyada, waxay robert haytammie escamilla . So [facility] [phone].    ATENCIÓN: Si habla español, tiene a land disposición servicios gratuitos de asistencia lingüística. Doug al [phone].    We comply with applicable federal civil rights laws and [place] laws. We do not discriminate on the basis of race, color, national origin, age, disability sex, sexual orientation or gender identity.            Thank you!     Thank you for choosing [facility] DERMATOLOGY  for your care. Our goal is always to provide you with excellent care. Hearing back from our patients is one way we can continue to improve our services. Please take a few minutes to complete the written survey that you may receive in the mail after your visit with us. Thank you!             Your Updated Medication List - Protect others around you: Learn how to safely use, store and throw away your medicines at www.disposemymeds.org.          This list is accurate as of: 9/1/17 11:35 AM.  Always use your most recent med list.                   Brand Name Dispense Instructions for use Diagnosis    DERMA-SMOOTHE/FS SCALP 0.01 % Oil     118 mL    1 to 2 capfulls to  scalp every other day, no occlusion needed, shampoo in am    Scarring alopecia       flunisolide 25 MCG/ACT (0.025%) Soln spray    NASALIDE     Spray 2 sprays into both nostrils as needed        * LAMICTAL 150 MG tablet   Generic drug:  lamoTRIgine      Take 300 mg by mouth daily        * LAMICTAL 100 MG tablet   Generic drug:  lamoTRIgine      Take 100 mg by mouth daily        MULTIPLE VITAMINS-MINERALS PO           OXcarbazepine 300 MG tablet    TRILEPTAL    120 tablet    Take 2 tablets (600 mg) by mouth 2 times daily    Epilepsy without status epilepticus, not intractable, unspecified       VIMPAT 100 MG Tabs tablet   Generic drug:  Lacosamide      Take 100 mg by mouth 2 times daily        * Notice:  This list has 2 medication(s) that are the same as other medications prescribed for you. Read the directions carefully, and ask your doctor or other care provider to review them with you.

## 2018-08-09 ENCOUNTER — TELEPHONE (OUTPATIENT)
Dept: DERMATOLOGY | Facility: CLINIC | Age: 58
End: 2018-08-09

## 2018-08-09 NOTE — TELEPHONE ENCOUNTER
[facility] Call Center    Phone Message    May a detailed message be left on voicemail: yes    Reason for Call: Other: Pt has to call for transporation ([employer]) and she needs to call 3 days ahead. Pt is requesting a call back TODAY regarding when her appt on Mon 8/13/18 would be finished. I explained to the pt that we would not know when her appt would be done, not knowing if dr. feldman would be running on time. Pt requested a nurse to call her TODAY, yet, for the same info.  Pt indicated that she may have to cancel this appt IF she cannot give a pickup time to [employer].  Please call pt at [phone]     Action Taken: Message routed to:  [facility] ([facility]  Dermatology Clinic

## 2018-08-09 NOTE — TELEPHONE ENCOUNTER
Attempted to reach patient. Left detailed msg letting her know we do not have a time we can tell her and that it would be an estimated time she would need to tell transportation. We cannot make this decision for her. Appointments times from start to finish for patients typically last One hour when running on schedule.

## 2018-08-10 NOTE — TELEPHONE ENCOUNTER
I called the patient and I left another message asking that she call back. It looks like there is an appointment on the 13th, but Ivett attempted to contact her about her ride situation. Please see the message below.   Marge Jones, [facility]

## 2018-08-10 NOTE — TELEPHONE ENCOUNTER
[facility]    Phone Message    May a detailed message be left on voicemail: yes    Reason for Call: Other: Pt called & stated the message someone left for her telling her they did not see an appt scheduled for her. I do see an appt scheduled 8/13. Pt is requesting a Nurse contact her to confirm this.      Action Taken: Message routed to:  [facility] ([facility] Derm

## 2018-08-13 ENCOUNTER — OFFICE VISIT (OUTPATIENT)
Dept: DERMATOLOGY | Facility: CLINIC | Age: 58
End: 2018-08-13
Payer: COMMERCIAL

## 2018-08-13 VITALS — SYSTOLIC BLOOD PRESSURE: 145 MMHG | HEART RATE: 62 BPM | DIASTOLIC BLOOD PRESSURE: 84 MMHG | RESPIRATION RATE: 17 BRPM

## 2018-08-13 DIAGNOSIS — L66.9 CICATRICIAL ALOPECIA: Primary | ICD-10-CM

## 2018-08-13 DIAGNOSIS — L30.9 DERMATITIS: ICD-10-CM

## 2018-08-13 ASSESSMENT — PAIN SCALES - GENERAL: PAINLEVEL: NO PAIN (0)

## 2018-08-13 NOTE — LETTER
8/13/2018       RE: Lynne Barnes  [address]  [place] [address]     Dear Colleague,    Thank you for referring your patient, Lynne Barnes, to the [facility] DERMATOLOGY at [facility]. Please see a copy of my visit note below.    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          History of Present Illness:  Ms. Lynne Barnes is a 57 year old female who is here to follow up on cicatricial alopecia, and eyelid dermatitis, last seen 12/8/17. Currently, she is using rogaine 5% foam nightly, and dermasmoothe oil weekly. Feels her hair loss is mostly unchanged. Denies scalp pain, itching, burning, or tingling.    Periorbital itching and hyperpigmentation around her eyes has improved with use of pimecrolimus 1% cream.     Past Medical History:   Patient Active Problem List   Diagnosis     Hyperpigmentation     Dermatitis     Acrochordon     Cicatricial alopecia     Localization-related (focal) (partial) symptomatic epilepsy and epileptic syndromes with complex partial seizures, intractable, with status epilepticus     Atrophy of skin     Past Medical History:   Diagnosis Date     Seizures (H)      History reviewed. No pertinent surgical history.      Medications:  Current Outpatient Prescriptions   Medication Sig Dispense Refill     flunisolide (NASALIDE) 25 MCG/ACT (0.025%) SOLN spray Spray 2 sprays into both nostrils as needed       Fluocinolone Acetonide Scalp (DERMA-SMOOTHE/FS SCALP) 0.01 % OIL oil Apply topically twice a week 1 to 2 capfulls to scalp every  other day, no occlusion needed, shampoo in am, do twice a week 118 mL 4     Lacosamide (VIMPAT) 100 MG TABS tablet Take 100 mg by mouth 2 times daily       lamoTRIgine (LAMICTAL) 100 MG tablet Take 100 mg by mouth daily       lamoTRIgine (LAMICTAL) 150 MG tablet Take 300 mg by mouth daily       MULTIPLE VITAMINS-MINERALS PO        OXcarbazepine (TRILEPTAL) 300 MG tablet Take 2 tablets (600 mg) by mouth 2 times daily 120 tablet 2     pimecrolimus (ELIDEL) 1 % cream Apply topically daily 60 g 1     Allergies   Allergen Reactions     Oxycodone Cough     Reduces sodium         Review of Systems:  -Constitutional: The patient denies fatigue, fevers, chills, unintended weight loss, and night sweats.  -HEENT: Patient denies nonhealing oral sores.  -Skin: As above in HPI. No additional skin concerns.    Physical exam:  Vitals: /84 (Cuff Size: Adult Regular)  Pulse 62  Resp 17  GEN: This is a well developed, well-nourished female in no acute distress, in a pleasant mood.    SKIN: Focused examination of the scalp and face was performed.  -LPPAI score 0 today, there is no perifollicular erythema or scale, and no generalized erythema  -Hair pull test negative  -Overall improved appearance of hair on mid scalp and vertex compared to photographs taken 12/8, with increased short terminal fibers filling in patch of alopecia   -Frontal and temporal hair line stable   -Few small dark brown papules on upper cheeks bilaterally   -No other lesions of concern on areas examined.     Impression/Plan:  1. 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 Overall appears slightly improved on exam today, with no active inflammation evident. Discussed continuing current treatments as patient is making progress, albeit slowly.     Continue Rogaine 5% foam daily    Continue Dermasmooth/FS 0.01% oil once a week     Continue to shampoo nightly with preferred gentle shampoo     2. Periorbital  dermatitis and pruritus -  Improving    Continue pimecrolimus 1% cream daily on affected area    Follow-up in 6 months, earlier for new or changing lesions.     Staff Involved:  Scribed by Peggy Traore MS3 for Dr. Hester.        Staff Physician:  I was present with the medical student who participated in the service and in the documentation of the note. I have verified the history and personally performed the physical exam and medical decision making. I agree with the assessment and plan of care as documented in the note.     Arianna Hester MD  Professor and Chair  Department of Dermatology  [facility]: Phone: [phone], Fax:[phone]  [facility]: Phone: [phone], Fax: [phone]  8/26/2018

## 2018-08-13 NOTE — MR AVS SNAPSHOT
After Visit Summary   2018    Lynne Barnes    MRN: [id number]           Patient Information     Date Of Birth          1960        Visit Information        Provider Department      2018 4:10 PM Arianna Hester MD [facility]        Today's Diagnoses     Cicatricial alopecia    -  1    Dermatitis           Follow-ups after your visit        Who to contact     Please call your clinic at [phone] to:    Ask questions about your health    Make or cancel appointments    Discuss your medicines    Learn about your test results    Speak to your doctor            Additional Information About Your Visit        MyChart Information     [employer] is an electronic gateway that provides easy, online access to your medical records. With [employer], you can request a clinic appointment, read your test results, renew a prescription or communicate with your care team.     To sign up for [employer] visit the website at [email or web address]   You will be asked to enter the access code listed below, as well as some personal information. Please follow the directions to create your username and password.     Your access code is: [email or web address]  Expires: 2018  3:41 PM     Your access code will  in 90 days. If you need help or a new code, please contact your [facility] or call [phone] for assistance.        Care EveryWhere ID     This is your Care EveryWhere ID. This could be used by other organizations to access your [facility] medical records  [id number]        Your Vitals Were     Pulse Respirations                62 17           Blood Pressure from Last 3 Encounters:   18 145/84   17 167/76   17 164/85    Weight from Last 3 Encounters:   16 54.4 kg (120 lb)   16 58.7 kg (129 lb 6.4 oz)   11/13/15 54.3 kg (119 lb 9.6 oz)              Today, you had the following     No orders found for display        Primary Care Provider Office Phone # Fax #    Luis Honeycutt [phone]       [facility] [address]  [facility] [address]        Equal Access to Services     SHANON GAINES : Hadii keke raymundo meli Soramya, waaxda luqadaha, qaybta kaalmada adeegyada, dinorah naylor laNavjottammie douglas. So [facility] [phone].    ATENCIÓN: Si habla español, tiene a land disposición servicios gratuitos de asistencia lingüística. Llame al [phone].    We comply with applicable federal civil rights laws and [place] laws. We do not discriminate on the basis of race, color, national origin, age, disability, sex, sexual orientation, or gender identity.            Thank you!     Thank you for choosing [facility]ER[facility]  for your care. Our goal is always to provide you with excellent care. Hearing back from our patients is one way we can continue to improve our services. Please take a few minutes to complete the written survey that you may receive in the mail after your visit with us. Thank you!             Your Updated Medication List - Protect others around you: Learn how to safely use, store and throw away your medicines at www.disposemymeds.org.          This list is accurate as of 8/13/18 11:59 PM.  Always use your most recent med list.                   Brand Name Dispense Instructions for use Diagnosis    flunisolide 25 MCG/ACT (0.025%) Soln spray    NASALIDE     Spray 2 sprays into both nostrils as needed        Fluocinolone Acetonide Scalp 0.01 % Oil oil    DERMA-SMOOTHE/FS SCALP    118 mL    Apply topically twice a week 1 to 2 capfulls to scalp every other day, no occlusion needed, shampoo in am, do twice a week    Scarring alopecia       * LAMICTAL 150 MG tablet   Generic drug:  lamoTRIgine      Take 300 mg by mouth daily        * LAMICTAL 100 MG tablet   Generic drug:  lamoTRIgine      Take 100 mg by mouth daily        MULTIPLE VITAMINS-MINERALS PO           OXcarbazepine 300 MG  tablet    TRILEPTAL    120 tablet    Take 2 tablets (600 mg) by mouth 2 times daily    Epilepsy without status epilepticus, not intractable, unspecified       pimecrolimus 1 % cream    ELIDEL    60 g    Apply topically daily    Dermatitis       VIMPAT 100 MG Tabs tablet   Generic drug:  Lacosamide      Take 100 mg by mouth 2 times daily        * Notice:  This list has 2 medication(s) that are the same as other medications prescribed for you. Read the directions carefully, and ask your doctor or other care provider to review them with you.

## 2018-08-13 NOTE — NURSING NOTE
Dermatology Rooming Note    Lynne Barnes's goals for this visit include:   Chief Complaint   Patient presents with     Hair Loss     Lynne is here today for a hair loss follow up- Lynne notes there is some improvment.      Marleni Rangel MA

## 2018-08-13 NOTE — PROGRESS NOTES
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          History of Present Illness:  Ms. Lynne Barnes is a 57 year old female who is here to follow up on cicatricial alopecia, and eyelid dermatitis, last seen 12/8/17. Currently, she is using rogaine 5% foam nightly, and dermasmoothe oil weekly. Feels her hair loss is mostly unchanged. Denies scalp pain, itching, burning, or tingling.    Periorbital itching and hyperpigmentation around her eyes has improved with use of pimecrolimus 1% cream.     Past Medical History:   Patient Active Problem List   Diagnosis     Hyperpigmentation     Dermatitis     Acrochordon     Cicatricial alopecia     Localization-related (focal) (partial) symptomatic epilepsy and epileptic syndromes with complex partial seizures, intractable, with status epilepticus     Atrophy of skin     Past Medical History:   Diagnosis Date     Seizures (H)      History reviewed. No pertinent surgical history.      Medications:  Current Outpatient Prescriptions   Medication Sig Dispense Refill     flunisolide (NASALIDE) 25 MCG/ACT (0.025%) SOLN spray Spray 2 sprays into both nostrils as needed       Fluocinolone Acetonide Scalp (DERMA-SMOOTHE/FS SCALP) 0.01 % OIL oil Apply topically twice a week 1 to 2 capfulls to scalp every other day, no occlusion needed, shampoo in am, do twice a week 118 mL 4     Lacosamide (VIMPAT) 100 MG TABS tablet Take 100 mg by mouth 2 times daily       lamoTRIgine (LAMICTAL) 100 MG tablet Take 100 mg by mouth daily       lamoTRIgine (LAMICTAL) 150 MG tablet Take 300 mg by mouth daily        MULTIPLE VITAMINS-MINERALS PO        OXcarbazepine (TRILEPTAL) 300 MG tablet Take 2 tablets (600 mg) by mouth 2 times daily 120 tablet 2     pimecrolimus (ELIDEL) 1 % cream Apply topically daily 60 g 1     Allergies   Allergen Reactions     Oxycodone Cough     Reduces sodium         Review of Systems:  -Constitutional: The patient denies fatigue, fevers, chills, unintended weight loss, and night sweats.  -HEENT: Patient denies nonhealing oral sores.  -Skin: As above in HPI. No additional skin concerns.    Physical exam:  Vitals: /84 (Cuff Size: Adult Regular)  Pulse 62  Resp 17  GEN: This is a well developed, well-nourished female in no acute distress, in a pleasant mood.    SKIN: Focused examination of the scalp and face was performed.  -LPPAI score 0 today, there is no perifollicular erythema or scale, and no generalized erythema  -Hair pull test negative  -Overall improved appearance of hair on mid scalp and vertex compared to photographs taken 12/8, with increased short terminal fibers filling in patch of alopecia   -Frontal and temporal hair line stable   -Few small dark brown papules on upper cheeks bilaterally   -No other lesions of concern on areas examined.     Impression/Plan:  1. 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 Overall appears slightly improved on exam today, with no active inflammation evident. Discussed continuing current treatments as patient is making progress, albeit slowly.     Continue Rogaine 5% foam daily    Continue Dermasmooth/FS 0.01% oil once a week     Continue to shampoo nightly with preferred gentle shampoo     2. Periorbital dermatitis and pruritus -  Improving    Continue pimecrolimus 1% cream daily on affected area    Follow-up in 6 months, earlier for new or changing lesions.     Staff Involved:  Scribed by Peggy Traore, MS3 for Dr. Hester.        Staff Physician:  I was present with the medical student who  participated in the service and in the documentation of the note. I have verified the history and personally performed the physical exam and medical decision making. I agree with the assessment and plan of care as documented in the note.     Arianna Hester MD  Professor and Chair  Department of Dermatology  [facility]: Phone: [phone], Fax:[phone]  [facility]: Phone: [phone], Fax: [phone]  8/26/2018

## 2018-09-07 ENCOUNTER — TELEPHONE (OUTPATIENT)
Dept: DERMATOLOGY | Facility: CLINIC | Age: 58
End: 2018-09-07

## 2018-09-07 DIAGNOSIS — L30.9 DERMATITIS: ICD-10-CM

## 2018-09-07 RX ORDER — PIMECROLIMUS 10 MG/G
CREAM TOPICAL DAILY
Qty: 60 G | Refills: 1 | Status: SHIPPED | OUTPATIENT
Start: 2018-09-07

## 2018-09-07 NOTE — TELEPHONE ENCOUNTER
[facility] Call Center    Phone Message    May a detailed message be left on voicemail: yes    Reason for Call: Medication Refill Request    Has the patient contacted the pharmacy for the refill? Yes   Name of medication being requested: pimecrolimus (ELIDEL) 1 % cream  Provider who prescribed the medication: Dr. Hester   Pharmacy: [facility] [id number] [address]  Date medication is needed: As soon as possible     Action Taken: Message routed to:  [facility] Dermatology

## 2018-09-07 NOTE — TELEPHONE ENCOUNTER
Rx re faxed to [facility], pt was notified.        [facility] Call Center    Phone Message    May a detailed message be left on voicemail: yes    Reason for Call: Medication Refill Request    Has the patient contacted the pharmacy for the refill? Yes   Name of medication being requested: pimecrolimus (ELIDEL) 1 % cream  Provider who prescribed the medication: Dr. Hester  Pharmacy: [address]  Date medication is needed: ASAP. Pt stated she spoke w/ her pharmacy 10 minutes ago and they did not receive this order.         Action Taken: Message routed to:  [facility] Derm

## 2018-09-12 NOTE — TELEPHONE ENCOUNTER
[facility] Call Center    Phone Message    May a detailed message be left on voicemail: yes    Reason for Call: Medication Question or concern regarding medication   Prescription Clarification  Name of Medication: pimecrolimus (ELIDEL) 1 % cream  Prescribing Provider: Dr Hester   Pharmacy: [facility], [address] [place] [address], Ph # [phone]   What on the order needs clarification? Pharmacy never rec'd this Rx - was written on 09/07/18 - please resend to Pharmacy      Action Taken: Message routed to:  [facility] Dermatology Clinic

## 2018-09-12 NOTE — TELEPHONE ENCOUNTER
I called the pharmacy and spoke with the pharmacist, he stated that they got the RX, it needs a PA to be done.   I called the patient and I let her know that we are waiting for a PA to be done and either approved or denied.   Marge Jones, [facility]

## 2018-09-12 NOTE — TELEPHONE ENCOUNTER
Prior Authorization Approval    Authorization Effective Date: 9/12/2018  Authorization Expiration Date: 9/12/2019  Medication: pimecrolimus (ELIDEL) 1 % cream - approved  Approved Dose/Quantity:   Reference #:     Insurance Company: [facility] [employer] - Phone [phone] Fax [phone]  Expected CoPay: n/a     CoPay Card Available:      Foundation Assistance Needed:    Which Pharmacy is filling the prescription (Not needed for infusion/clinic administered): [facility] [id number] [place] [address]  Pharmacy Notified: Yes  Patient Notified: Yes      Please document approval once received.

## 2018-09-12 NOTE — TELEPHONE ENCOUNTER
Central Prior Authorization Team   Phone: [phone]    PA Initiation    Medication: pimecrolimus (ELIDEL) 1 % cream  Insurance Company: [employer] [facility]   Pharmacy Filling the Rx: [facility] [id number] [place], [place] - [address] [place] [address]  Filling Pharmacy Phone: [phone]  Filling Pharmacy Fax:    Start Date: 9/12/2018

## 2018-09-18 ENCOUNTER — TELEPHONE (OUTPATIENT)
Dept: DERMATOLOGY | Facility: CLINIC | Age: 58
End: 2018-09-18

## 2018-09-18 NOTE — TELEPHONE ENCOUNTER
Prior Authorization Retail Medication Request    Medication/Dose: pimecrolimus (ELIDEL) 1 % cream  ICD code (if different than what is on RX):    Previously Tried and Failed:  hydrocortisone cream, vaseline  Rationale:  Lynne has been using this medication since 9/2017.    Insurance Name:  [email or web address]  Insurance ID:  [id number]      Pharmacy Information (if different than what is on RX)  [facility]  Phone:  [phone]

## 2018-09-19 NOTE — TELEPHONE ENCOUNTER
Prior Authorization Not Needed per Insurance    Medication: pimecrolimus (ELIDEL) 1 % cream - NOT NEEDED  Insurance Company: [employer] - Phone [phone] Fax [phone]  Expected CoPay:      Pharmacy Filling the Rx: [facility] [id number] [place] [address]  Pharmacy Notified: Yes  Patient Notified: Yes    [facility] verified Rx is filled for patient.

## 2020-05-20 DIAGNOSIS — L30.9 DERMATITIS: ICD-10-CM

## 2020-05-21 RX ORDER — PIMECROLIMUS 1 %
CREAM (GRAM) TOPICAL
Qty: 60 G | Refills: 1 | OUTPATIENT
Start: 2020-05-21

## 2020-05-21 NOTE — TELEPHONE ENCOUNTER
Last Clinic Visit:  8/13/18  [place]: NONE   Scheduling has been notified to contact the pt for appointment

## 2024-03-28 NOTE — TELEPHONE ENCOUNTER
"I called Lynne to get a little more information about her eye situation. Lynne notes that \" Dr. Michelle knows the situation, I would like for him to give me a call\". I explained that Dr. Michelle was not in the office today but I will forward him the message.    Joyce Michelel MA    "
----- Message from Marina Grider sent at 5/24/2017  1:08 PM CDT -----  Regarding: Call back  Contact: [phone]  Pt is requesting a call back regarding a question she has about her eye. She previously saw Dr. Michelle for this before, and states that he will know what it is regarding. I apologize for the lack of detail.    Thank you!    KI    Please DO NOT send this message and/or reply back to sender.  Call Center Representatives DO NOT respond to messages.      
Received message via nursing staff that patient had called about recent skin tag removed with electrocautery. Left voice message for patient to call nursing line back and they can relay message to either me or on the on call reside so it can be answered. She can also schedule an appointment with me at her earliest convenience if needed. Of note, patient already has an appointment schedule for tomorrow 5/26/17 with Dr. Hester and this issue can be likely discussed then.     Vamsi Michelle MD   PGY-2 Dermatology Resident  Pager [phone]    
Bleeding that does not stop/Fever greater than (need to indicate Fahrenheit or Celsius)/Nausea and vomiting that does not stop/Inability to tolerate liquids or foods